# Patient Record
(demographics unavailable — no encounter records)

---

## 2025-01-10 NOTE — HISTORY OF PRESENT ILLNESS
[Family Member] : family member [FreeTextEntry1] : NPA-CPE [de-identified] : 67 year F presents for annual physical exam.  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  She was a runner, did yoga, ate very healthy.   She was put of lipitor every other day by her PCP.  She was evaluated by liver specialist, Dr Calderon, before starting Xeljanz and was told to stop taking statin, because she didnt need it.   Last CPE with labs was 2023

## 2025-01-10 NOTE — PHYSICAL EXAM
[Normal] : affect was normal and insight and judgment were intact [de-identified] : hearing aides

## 2025-01-10 NOTE — HEALTH RISK ASSESSMENT
[No] : In the past 12 months have you used drugs other than those required for medical reasons? No [Never] : Never [Patient reported mammogram was normal] : Patient reported mammogram was normal [Patient reported PAP Smear was normal] : Patient reported PAP Smear was normal [Patient reported bone density results were abnormal] : Patient reported bone density results were abnormal [Patient reported colonoscopy was normal] : Patient reported colonoscopy was normal [Very Good] : ~his/her~  mood as very good [PHQ-2 Negative - No further assessment needed] : PHQ-2 Negative - No further assessment needed [Audit-CScore] : 0 [de-identified] : was an avid runner and yoga [de-identified] : omnivore, healthy [EyeExamDate] : 12/2024 [None] : None [] :  [# Of Children ___] : has [unfilled] children [Fully functional (bathing, dressing, toileting, transferring, walking, feeding)] : Fully functional (bathing, dressing, toileting, transferring, walking, feeding) [Fully functional (using the telephone, shopping, preparing meals, housekeeping, doing laundry, using] : Fully functional and needs no help or supervision to perform IADLs (using the telephone, shopping, preparing meals, housekeeping, doing laundry, using transportation, managing medications and managing finances) [Reports changes in hearing] : Reports no changes in hearing [Reports changes in vision] : Reports no changes in vision [Reports changes in dental health] : Reports no changes in dental health [MammogramDate] : 12/2024 [PapSmearDate] : 11/2024 [BoneDensityDate] : 12/2023 [BoneDensityComments] : osteopenia  [ColonoscopyDate] : 06/2020